# Patient Record
Sex: MALE | Race: WHITE | NOT HISPANIC OR LATINO | Employment: UNEMPLOYED | URBAN - METROPOLITAN AREA
[De-identification: names, ages, dates, MRNs, and addresses within clinical notes are randomized per-mention and may not be internally consistent; named-entity substitution may affect disease eponyms.]

---

## 2019-01-27 ENCOUNTER — HOSPITAL ENCOUNTER (EMERGENCY)
Facility: HOSPITAL | Age: 34
Discharge: HOME | End: 2019-01-27
Attending: EMERGENCY MEDICINE

## 2019-01-27 VITALS
RESPIRATION RATE: 18 BRPM | SYSTOLIC BLOOD PRESSURE: 130 MMHG | WEIGHT: 180 LBS | BODY MASS INDEX: 27.28 KG/M2 | TEMPERATURE: 98 F | HEIGHT: 68 IN | DIASTOLIC BLOOD PRESSURE: 74 MMHG | OXYGEN SATURATION: 98 % | HEART RATE: 104 BPM

## 2019-01-27 DIAGNOSIS — M25.569 KNEE PAIN: Primary | ICD-10-CM

## 2019-01-27 PROCEDURE — 96372 THER/PROPH/DIAG INJ SC/IM: CPT

## 2019-01-27 PROCEDURE — 99284 EMERGENCY DEPT VISIT MOD MDM: CPT | Mod: ,,, | Performed by: PHYSICIAN ASSISTANT

## 2019-01-27 PROCEDURE — 99284 EMERGENCY DEPT VISIT MOD MDM: CPT | Mod: 25

## 2019-01-27 PROCEDURE — 99284 PR EMERGENCY DEPT VISIT,LEVEL IV: ICD-10-PCS | Mod: ,,, | Performed by: PHYSICIAN ASSISTANT

## 2019-01-27 PROCEDURE — 63600175 PHARM REV CODE 636 W HCPCS: Performed by: PHYSICIAN ASSISTANT

## 2019-01-27 RX ORDER — NAPROXEN 500 MG/1
500 TABLET ORAL 2 TIMES DAILY WITH MEALS
Qty: 15 TABLET | Refills: 0 | Status: SHIPPED | OUTPATIENT
Start: 2019-01-27

## 2019-01-27 RX ORDER — KETOROLAC TROMETHAMINE 30 MG/ML
10 INJECTION, SOLUTION INTRAMUSCULAR; INTRAVENOUS
Status: COMPLETED | OUTPATIENT
Start: 2019-01-27 | End: 2019-01-27

## 2019-01-27 RX ADMIN — KETOROLAC TROMETHAMINE 10 MG: 30 INJECTION, SOLUTION INTRAMUSCULAR at 05:01

## 2019-01-27 NOTE — ED TRIAGE NOTES
Kiswahili speaking male presents to ER with complaint of pain to his right knee after injury ing it playing soccer last pm.  Cousin here to interpret.  Patient's name and date of birth checked and is correct.    LOC: The patient is awake, alert, and oriented to place, time, situation. Affect is appropriate.  Speech is appropriate and clear.      APPEARANCE: Patient resting comfortably, and is  in no acute distress.  Patient is clean and well groomed.     SKIN: The skin is warm and dry; color consistent with ethnicity.  Patient has normal skin turgor and moist mucus membranes.  Skin intact; no breakdown or bruising noted.      MUSCULOSKELETAL: Patient moving upper extremities without difficulty.  Guarding right knee due to pain.      RESPIRATORY: Airway is open and patent. Respirations spontaneous, even, easy, and non-labored.  Patient has a normal effort and rate.  No accessory muscle use noted. Denies cough.  BS clear.     CARDIAC:  No peripheral edema noted. No complaints of chest pain.       ABDOMEN: Soft and non tender to palpation.  No distention noted.      NEUROLOGIC: Eyes open spontaneously.  Behavior appropriate to situation.  Follows commands; facial expression symmetrical.  Purposeful motor response noted; normal sensation in all extremities.

## 2019-01-27 NOTE — ED PROVIDER NOTES
Encounter Date: 1/27/2019       History     Chief Complaint   Patient presents with    Knee Injury     right knee injury from playing soccer     Patient is a 33-year-old Nepali speaking male presenting to the ER for evaluation of right knee injury. Patient was playing soccer last night when he states that he jumped and landed on his right lower extremity very hard.  He states that since the injury he has noticed pain to the posterior and lateral right knee.  Denies twisting mechanism.  He has been able to bear weight and ambulate since the injury. He states the pain worsens upon full extension.  Denies any paresthesia or focal weakness. No prior injury or surgery to the region.  He denies taking medication prior to arrival to the ED.  Has not noticed redness or swelling to the site.  No fevers or chills.    Patient's friend present in room for translation       The history is provided by a friend and the patient. A  was used (friend in room ).     Review of patient's allergies indicates:  No Known Allergies  History reviewed. No pertinent past medical history.  History reviewed. No pertinent surgical history.  History reviewed. No pertinent family history.  Social History     Tobacco Use    Smoking status: Current Every Day Smoker     Types: Cigarettes    Smokeless tobacco: Never Used   Substance Use Topics    Alcohol use: No     Frequency: Never    Drug use: No     Review of Systems   Constitutional: Negative for chills and fever.   Musculoskeletal: Positive for arthralgias. Negative for back pain, gait problem, joint swelling and myalgias.   Skin: Negative for rash and wound.   Allergic/Immunologic: Negative for immunocompromised state.   Neurological: Negative for weakness and numbness.   Hematological: Does not bruise/bleed easily.   Psychiatric/Behavioral: Negative for confusion.       Physical Exam     Initial Vitals [01/27/19 1629]   BP Pulse Resp Temp SpO2   130/74 104 18 97.5 °F  (36.4 °C) 98 %      MAP       --         Physical Exam    Vitals reviewed.  Constitutional: He appears well-developed and well-nourished. He is not diaphoretic. No distress.   HENT:   Head: Normocephalic and atraumatic.   Eyes: Conjunctivae and EOM are normal.   Neck: Neck supple.   Cardiovascular: Normal rate, regular rhythm, normal heart sounds, intact distal pulses and normal pulses.   Pulmonary/Chest: Breath sounds normal.   Musculoskeletal:        Right knee: He exhibits normal range of motion, no swelling, no deformity, no LCL laxity and no MCL laxity. Tenderness (Tenderness diffusely, posterior) found. Lateral joint line tenderness noted.   Skin: Skin is warm and dry.         ED Course   Procedures  Labs Reviewed - No data to display       Imaging Results          X-Ray Knee 3 View Right (Final result)  Result time 01/27/19 17:28:49    Final result by Jd Saavedra MD (01/27/19 17:28:49)                 Impression:      1. No acute displaced fracture or dislocation of the knee.      Electronically signed by: Jd Saavedra MD  Date:    01/27/2019  Time:    17:28             Narrative:    EXAMINATION:  XR KNEE 3 VIEW RIGHT    CLINICAL HISTORY:  Pain in unspecified knee    TECHNIQUE:  AP, lateral, and Merchant views of the right knee were performed.    COMPARISON:  None    FINDINGS:  Three views.    No acute displaced fracture or dislocation of the knee.  No radiopaque foreign body.  No large knee joint effusion.                                       APC / Resident Notes:   Patient seen in the ER promptly upon arrival.  He is afebrile, no acute distress.  Patient's friend present in Room for translation.  He does have tenderness on palpation to the right lateral knee.  He also has tenderness to the posterior aspect.  No laxity noted on valgus or varus stress.  No effusion or swelling noted. Patient was able to bear weight.  Distal pulses intact. Strength of lower extremities equal bilaterally.  X-ray of  knee obtained. No evidence of acute displaced fracture or dislocation.  Patient was given Toradol for pain in ED.    Ordered knee immobilizer but patient states he has one at home. Will prescribed home on naproxen to use as directed.  Advised patient to ice elevate and rest.  He is to follow up with family doctor provided for possible outpatient MRI if needed.  He is stable for discharge and close follow-up. The care of this patient was overseen by attending physician who agrees with treatment, plan, and disposition.                   Clinical Impression:   The encounter diagnosis was Knee pain.      Disposition:   Disposition: Discharged  Condition: Stable                        Tamiko Shook PA-C  01/27/19 1749       Tamiko Shook PA-C  01/27/19 1553